# Patient Record
Sex: MALE | Race: WHITE
[De-identification: names, ages, dates, MRNs, and addresses within clinical notes are randomized per-mention and may not be internally consistent; named-entity substitution may affect disease eponyms.]

---

## 2018-01-08 NOTE — ER REPORT
History and Physical


Time Seen By MD:  21:22


HPI/ROS


CHIEF COMPLAINT: [facility] clearance





HISTORY OF PRESENT ILLNESS: 22-year-old male brought in by police for custodial 

clearance.  Patient voices no complaints.  He has slurred speech and odor of 

EtOH, consistent with alcohol intoxication.  Patient denies significant past 

medical history.





REVIEW OF SYSTEMS:


Respiratory: No cough, no dyspnea.


Cardiovascular: No chest pain, no palpitations.


Gastrointestinal: No vomiting, no abdominal pain.


Musculoskeletal: No back pain.


Allergies:  


Coded Allergies:  


     No Known Drug Allergies (Unverified , 1/8/18)


Home Meds


No Active Prescriptions or Reported Meds


Reviewed Nurses Notes:  Yes


Old Medical Records Reviewed:  Yes


Constitutional





Vital Sign - Last 24 Hours








 1/8/18





 21:24


 


Temp 97.7


 


Pulse 128


 


Resp 19


 


B/P (MAP) 132/96


 


Pulse Ox 94


 


O2 Delivery Room Air








Physical Exam


 Vital signs stable, afebrile, pulse ox normal.  Mildly tachycardic


 General Appearance: The patient is alert, has no immediate need for airway 

protection and no current signs of toxicity.  Palpation of the head and neck 

reveals no tenderness or trauma


HEENT: Pupils equal and round no injection.  TMs normal, oropharynx without 

redness or exudate


Respiratory: Chest is non tender, lungs are clear to auscultation.  No chest 

wall tenderness


Cardiac: regular rate and rhythm


Gastrointestinal: Abdomen is soft and non tender, no masses, bowel sounds 

normal.


Musculoskeletal:  Neck: Neck is supple and non tender.


Extremities have full range of motion and are non tender.  No evidence of 

trauma on palpation or passive movement of joints


Skin: No rashes or lesions.





DIFFERENTIAL DIAGNOSIS: After history and physical exam differential diagnosis 

was considered for custodial clearance, alcohol intoxication, polysubstance abuse





Medical Decision Making


ED Course/Re-evaluation


ED Course


Patient was admitted to an examination room.  H&P was done.  The differential 

diagnoses was considered.  On clinical examination.  Patient voices no 

complaints.  He has no clinical findings.  His vital signs are stable except 

for mild tachycardia.  He is medically cleared for custodial admission.


Decision to Disposition Date:  Jan 8, 2018


Decision to Disposition Time:  21:26





Depart


Departure


Latest Vital Signs





Vital Signs








  Date Time  Temp Pulse Resp B/P (MAP) Pulse Ox O2 Delivery O2 Flow Rate FiO2


 


1/8/18 21:24 97.7 128 19 132/96 94 Room Air  








Impression:  


 Primary Impression:  


 Medical clearance for incarceration


 Additional Impression:  


 Alcohol intoxication


Condition:  Improved


Disposition:  [facility] TO group home/CORRECTIONAL F


New Scripts


No Active Prescriptions or Reported Meds


Patient Instructions:  Alcohol Intoxication (ED)





Additional Instructions:  


Medically clear for custodial admission





Problem Qualifiers








 Additional Impression:  


 Alcohol intoxication


 Complication of substance-induced condition:  uncomplicated  Qualified Codes:  

F10.920 - Alcohol use, unspecified with intoxication, uncomplicated








ALESHA DANG DO Jan 8, 2018 21:26

## 2018-08-05 ENCOUNTER — HOSPITAL ENCOUNTER (EMERGENCY)
Dept: HOSPITAL 89 - ER | Age: 23
Discharge: HOME | End: 2018-08-05
Payer: COMMERCIAL

## 2018-08-05 VITALS — SYSTOLIC BLOOD PRESSURE: 127 MMHG | DIASTOLIC BLOOD PRESSURE: 79 MMHG

## 2018-08-05 DIAGNOSIS — R20.2: Primary | ICD-10-CM

## 2018-08-05 DIAGNOSIS — R53.83: ICD-10-CM

## 2018-08-05 DIAGNOSIS — E87.6: ICD-10-CM

## 2018-08-05 LAB — PLATELET COUNT, AUTOMATED: 217 K/UL (ref 150–450)

## 2018-08-05 PROCEDURE — 82310 ASSAY OF CALCIUM: CPT

## 2018-08-05 PROCEDURE — 85651 RBC SED RATE NONAUTOMATED: CPT

## 2018-08-05 PROCEDURE — 84484 ASSAY OF TROPONIN QUANT: CPT

## 2018-08-05 PROCEDURE — 84295 ASSAY OF SERUM SODIUM: CPT

## 2018-08-05 PROCEDURE — 84450 TRANSFERASE (AST) (SGOT): CPT

## 2018-08-05 PROCEDURE — 99283 EMERGENCY DEPT VISIT LOW MDM: CPT

## 2018-08-05 PROCEDURE — 85025 COMPLETE CBC W/AUTO DIFF WBC: CPT

## 2018-08-05 PROCEDURE — 84460 ALANINE AMINO (ALT) (SGPT): CPT

## 2018-08-05 PROCEDURE — 84075 ASSAY ALKALINE PHOSPHATASE: CPT

## 2018-08-05 PROCEDURE — 86140 C-REACTIVE PROTEIN: CPT

## 2018-08-05 PROCEDURE — 93005 ELECTROCARDIOGRAM TRACING: CPT

## 2018-08-05 PROCEDURE — 71046 X-RAY EXAM CHEST 2 VIEWS: CPT

## 2018-08-05 PROCEDURE — 82040 ASSAY OF SERUM ALBUMIN: CPT

## 2018-08-05 PROCEDURE — 84520 ASSAY OF UREA NITROGEN: CPT

## 2018-08-05 PROCEDURE — 82374 ASSAY BLOOD CARBON DIOXIDE: CPT

## 2018-08-05 PROCEDURE — 82435 ASSAY OF BLOOD CHLORIDE: CPT

## 2018-08-05 PROCEDURE — 84155 ASSAY OF PROTEIN SERUM: CPT

## 2018-08-05 PROCEDURE — 82247 BILIRUBIN TOTAL: CPT

## 2018-08-05 PROCEDURE — 82565 ASSAY OF CREATININE: CPT

## 2018-08-05 PROCEDURE — 82947 ASSAY GLUCOSE BLOOD QUANT: CPT

## 2018-08-05 PROCEDURE — 84132 ASSAY OF SERUM POTASSIUM: CPT

## 2018-08-05 NOTE — EKG
FACILITY: [facility] [place] 

 

PATIENT NAME: SANDRA YOUSSEF

: [id number]

MR: [id number]

V: [id number]

EXAM DATE: 

ORDERING PHYSICIAN: MARIA TERESA PELLETIER

TECHNOLOGIST: PANTIER

 

Test Reason : 

Blood Pressure : ***/*** mmHG

Vent. Rate : 107 BPM     Atrial Rate : 107 BPM

   P-R Int : 142 ms          QRS Dur : 094 ms

    QT Int : 348 ms       P-R-T Axes : 077 086 041 degrees

   QTc Int : 464 ms

 

Sinus tachycardia

Otherwise normal ECG

No previous ECGs available

Confirmed by Justin Barnett ([id number]) on 2018 6:22:11 AM

 

Referred By:             Confirmed By:Justin Bull

## 2018-08-05 NOTE — RADIOLOGY IMAGING REPORT
FACILITY: [facility] 

 

PATIENT NAME: Garrick Harrington

: 1995

MR: [id number]

V: [id number]

EXAM DATE: 

ORDERING PHYSICIAN: MARIA TERESA PELLETIER

TECHNOLOGIST: 

 

Location: [facility] [place]

Patient: Garrick Harrington

: 1995

MRN: [id number]

Visit/Account:[id number]

Date of Sevice:  2018

 

ACCESSION #: [id number]

 

CHEST PA AND LAT

 

COMPARISONS: None.

 

ADDITIONAL PERTINENT HISTORY: Left arm numbness

 

FINDINGS:

Cardiomediastinal silhouette:  Negative.

 

Pulmonary vasculature:  Negative.

 

Lung fields:  Negative.

 

Pleural spaces: Negative.

 

Osseous structures:  Negative.

 

Surrounding soft tissues:  Negative.

 

 

IMPRESSION: No evidence of acute cardiopulmonary disease.

 

Report Dictated By: Carlitos Boyce MD at 2018 4:31 AM

 

Report E-Signed By: Carlitos Boyce MD  at 2018 4:33 AM

 

WSN:[id number]

## 2018-08-05 NOTE — ER REPORT
History and Physical


Time Seen By MD:  03:40


Hx. of Stated Complaint:  


patient states he woke up around a half hour ago with tingling in his left arm, 


starting in mid bicep to fingers, tingling comes and goes.


HPI/ROS


CHIEF COMPLAINT: left arm numbness, concern about possible heart attack





HISTORY OF PRESENT ILLNESS: This is a 23 year old male. He came to the ER 

tonight after waking up with left arm numbness. The numbness was not there when 

he went to bed. He awoke and the numbness was present, then would wax and wane 

and now is gone. He has been very fatigued the last few days. No chest pain 

with this. No shortness of breath. No nausea or vomiting. He is aware that his 

grandfather suffered a heart attack with these same symptoms and no others, so 

was worried that this could be a heart problem. No personal history of heart 

problems. He does not think there is any history of heart failure, valvular 

diseased, or cardiac inflammatory or autoimmune conditions. He has not been ill 

recently. Does have some allergy problems, but no major problem recently. No 

headache, vision changes, dizziness, near syncope. He has no other numbness or 

any weakness. No pain anywhere, including abdominal pain, back or neck pain, 

pain in the extremities. No new medications. No herbals, supplements, vitamins, 

OTCs, etc. No unusual food or drink. He has no trouble eating. Normal bowel and 

bladder function. No drug use or alcohol use, although occasional drinking.





REVIEW OF SYSTEMS:


As above.


Allergies:  


Coded Allergies:  


     No Known Drug Allergies (Unverified , 1/8/18)


Home Meds


No Active Prescriptions or Reported Meds


Reviewed Nurses Notes:  Yes


Hx Alcohol Use:  Yes (ALCOHOL)


Constitutional





Vital Sign - Last 24 Hours








 8/5/18 8/5/18 8/5/18 8/5/18





 03:35 03:36 03:46 04:00


 


Temp 98.1   


 


Pulse 112  98 


 


Resp 27  17 


 


B/P (MAP) 155/105 152/96 (114)  138/88 (105)


 


Pulse Ox 96   


 


O2 Delivery Room Air   


 


    





 8/5/18 8/5/18 8/5/18 8/5/18





 04:01 04:30 04:31 04:46


 


Pulse 91  88 70


 


Resp 14  22 12


 


B/P (MAP)  127/79 (95)  


 


Pulse Ox 96  95 95





 8/5/18   





 04:51   


 


Pulse 79   


 


Resp 12   


 


Pulse Ox 95   








Physical Exam


   General Appearance: The patient is alert. No acute distress.


Eyes: Pupils are equal, round. No pallor, injection or icterus. Reactive to 

light. Extraocular movements are intact. No nystagmus.


ENT: Mucous membranes are moist. Normal oral mucosa. Posterior oropharynx is 

normal other than a small amount of post-nasal drainage. Normal TM and canals.


Neck: Supple and non tender. No lymphadenopathy.


Respiratory: Breathing easily and unlabored. Lungs are clear to auscultation.


Cardiovascular: Tachycardia, but regular rhythm. No murmurs, gallops or rubs. 

Normal capillary refill. No edema.


Gastrointestinal:  Abdomen is soft and non tender. Nondistended. Normal active 

bowel sounds. No costovertebral angle tenderness with percussion.


Neurological: Alert and oriented x3. Cranial nerves normal with eye exam noted 

above. Normal sensation in the face without weakness or droop. Tongue midline 

with symmetric palate elevation. No focal neurologic deficits in the 

extremities with normal strength and sensation throughout. Normal reflexes.


Skin: Warm and dry. No rashes.


Musculoskeletal: Extremities are nontender. Full range of motion. No tenderness 

in palpation of the cervical, thoracic and lumbar spine. No chest pain with 

palpation.





DIFFERENTIAL DIAGNOSIS: After history and physical exam, differential diagnosis 

was considered for chest pain including but not limited to myocardial ischemia, 

pericarditis, pulmonary embolus, chest wall pain, pleural inflammation and 

pulmonary infectious causes. Numbness most likely due to nerve compression from 

sleeping on the left side.





Medical Decision Making


Data Points


Result Diagram:  


8/5/18 0348                                                                    

            8/5/18 0348





Laboratory





Hematology








Test


  8/5/18


03:48


 


Red Blood Count


  5.49 M/uL


(4.00-5.60)


 


Mean Corpuscular Volume


  85.8 fL


(80.0-96.0)


 


Mean Corpuscular Hemoglobin


  30.6 pg


(26.0-33.0)


 


Mean Corpuscular Hemoglobin


Concent 35.7 g/dL


(32.0-36.0)


 


Red Cell Distribution Width


  12.9 %


(11.5-14.5)


 


Mean Platelet Volume


  7.7 fL


(7.2-11.1)


 


Neutrophils (%) (Auto)


  55.0 %


(39.4-72.5)


 


Lymphocytes (%) (Auto)


  36.0 %


(17.6-49.6)


 


Monocytes (%) (Auto)


  6.8 %


(4.1-12.4)


 


Eosinophils (%) (Auto)


  1.7 %


(0.4-6.7)


 


Basophils (%) (Auto)


  0.5 %


(0.3-1.4)


 


Nucleated RBC Relative Count


(auto) 0.1 /100WBC 


 


 


Neutrophils # (Auto)


  5.0 K/uL


(2.0-7.4)


 


Lymphocytes # (Auto)


  3.3 K/uL


(1.3-3.6)


 


Monocytes # (Auto)


  0.6 K/uL


(0.3-1.0)


 


Eosinophils # (Auto)


  0.2 K/uL


(0.0-0.5)


 


Basophils # (Auto)


  0.0 K/uL


(0.0-0.1)


 


Nucleated RBC Absolute Count


(auto) 0.01 K/uL 


 


 


Erythrocyte Sedimentation Rate


  1 mm/HOUR


(0-15)


 


Sodium Level


  138 mmol/L


(137-145)


 


Potassium Level


  3.3 mmol/L


(3.5-5.0)


 


Chloride Level


  100 mmol/L


()


 


Carbon Dioxide Level


  23 mmol/L


(22-30)


 


Blood Urea Nitrogen


  18 mg/dl


(9-21)


 


Creatinine


  1.10 mg/dl


(0.66-1.25)


 


Glomerular Filtration Rate


Calc > 60.0 


 


 


Random Glucose


  94 mg/dl


()


 


Calcium Level


  9.6 mg/dl


(8.4-10.2)


 


Total Bilirubin


  0.7 mg/dl


(0.2-1.3)


 


Aspartate Amino Transf


(AST/SGOT) 24 U/L (0-35) 


 


 


Alanine Aminotransferase


(ALT/SGPT) 44 U/L (0-56) 


 


 


Alkaline Phosphatase 31 U/L (0-126) 


 


Troponin I < 0.012 ng/ml 


 


C-Reactive Protein


  0.6 mg/dl


(<1.0)


 


Total Protein


  7.6 g/dl


(6.3-8.2)


 


Albumin


  4.7 g/dl


(3.5-5.0)








Chemistry








Test


  8/5/18


03:48


 


White Blood Count


  9.1 k/uL


(4.5-11.0)


 


Red Blood Count


  5.49 M/uL


(4.00-5.60)


 


Hemoglobin


  16.8 g/dL


(14.0-18.0)


 


Hematocrit


  47.1 %


(42.0-52.0)


 


Mean Corpuscular Volume


  85.8 fL


(80.0-96.0)


 


Mean Corpuscular Hemoglobin


  30.6 pg


(26.0-33.0)


 


Mean Corpuscular Hemoglobin


Concent 35.7 g/dL


(32.0-36.0)


 


Red Cell Distribution Width


  12.9 %


(11.5-14.5)


 


Platelet Count


  217 K/uL


(150-450)


 


Mean Platelet Volume


  7.7 fL


(7.2-11.1)


 


Neutrophils (%) (Auto)


  55.0 %


(39.4-72.5)


 


Lymphocytes (%) (Auto)


  36.0 %


(17.6-49.6)


 


Monocytes (%) (Auto)


  6.8 %


(4.1-12.4)


 


Eosinophils (%) (Auto)


  1.7 %


(0.4-6.7)


 


Basophils (%) (Auto)


  0.5 %


(0.3-1.4)


 


Nucleated RBC Relative Count


(auto) 0.1 /100WBC 


 


 


Neutrophils # (Auto)


  5.0 K/uL


(2.0-7.4)


 


Lymphocytes # (Auto)


  3.3 K/uL


(1.3-3.6)


 


Monocytes # (Auto)


  0.6 K/uL


(0.3-1.0)


 


Eosinophils # (Auto)


  0.2 K/uL


(0.0-0.5)


 


Basophils # (Auto)


  0.0 K/uL


(0.0-0.1)


 


Nucleated RBC Absolute Count


(auto) 0.01 K/uL 


 


 


Erythrocyte Sedimentation Rate


  1 mm/HOUR


(0-15)


 


Glomerular Filtration Rate


Calc > 60.0 


 


 


Calcium Level


  9.6 mg/dl


(8.4-10.2)


 


Total Bilirubin


  0.7 mg/dl


(0.2-1.3)


 


Aspartate Amino Transf


(AST/SGOT) 24 U/L (0-35) 


 


 


Alanine Aminotransferase


(ALT/SGPT) 44 U/L (0-56) 


 


 


Alkaline Phosphatase 31 U/L (0-126) 


 


Troponin I < 0.012 ng/ml 


 


C-Reactive Protein


  0.6 mg/dl


(<1.0)


 


Total Protein


  7.6 g/dl


(6.3-8.2)


 


Albumin


  4.7 g/dl


(3.5-5.0)











EKG/Imaging


EKG Interpretation


12 lead EKG:


      Rhythm: Sinus tachycardia, rate 107


      Axis: normal 


      QRS: normal


      ST segments: normal


Imaging


CHEST PA AND LAT


 


COMPARISONS: None.


 


ADDITIONAL PERTINENT HISTORY: Left arm numbness


 


FINDINGS:


Cardiomediastinal silhouette:  Negative.


 


Pulmonary vasculature:  Negative.


 


Lung fields:  Negative.


 


Pleural spaces: Negative.


 


Osseous structures:  Negative.


 


Surrounding soft tissues:  Negative.


 


 


IMPRESSION: No evidence of acute cardiopulmonary disease.


 


Report Dictated By: Carlitos Boyce MD at 8/5/2018 4:31 AM





ED Course/Re-evaluation


Clinical Indication for ER IV:  IV Access


ED Course


Patient given aspirin 81 mg 4 chewable aspirin. Evaluation does not reveal any 

definitive reason for the numbness, likely due to the patient lying on the left 

arm while sleeping. Labs were obtained and are negative other than a slightly 

low potassium at 3.3. Discussed this with the patient as well as dietary 

measures to increase potassium intake. Troponin, CBC, CRP, sedimentation rate 

are negative. EKG and x-ray negative as noted above other than the sinus 

tachycardia. After getting all the studies, the patient's heart rate has come 

down to normal.


Decision to Disposition Date:  Aug 5, 2018


Decision to Disposition Time:  04:52





Depart


Departure


Latest Vital Signs





Vital Signs








  Date Time  Temp Pulse Resp B/P (MAP) Pulse Ox O2 Delivery O2 Flow Rate FiO2


 


8/5/18 04:51  79 12  95   


 


8/5/18 04:30    127/79 (95)    


 


8/5/18 03:35 98.1     Room Air  








Impression:  


 Primary Impression:  


 Paresthesia of left arm


Condition:  Improved


Disposition:  HOME OR SELF-CARE


New Scripts


No Active Prescriptions or Reported Meds


Patient Instructions:  Paresthesia (ED)





Additional Instructions:  


We think that the numbness you had in your arm was from compression of a nerve 

from sleeping on the left arm.


The numbness is gone and studies here in the ER were negative.











MARIA TERESA PELLETIER MD Aug 5, 2018 03:41